# Patient Record
(demographics unavailable — no encounter records)

---

## 2024-10-23 NOTE — HISTORY OF PRESENT ILLNESS
[Disease: _____________________] : Disease: [unfilled] [T: ___] : T[unfilled] [N: ___] : N[unfilled] [M: ___] : M[unfilled] [AJCC Stage: ____] : AJCC Stage: [unfilled] [de-identified] : Mr. Charlie Tsai is a 72-year-old man who I met in early August for the first time after his PSA had been noted to be markedly elevated. PSA Jan 2021: 18.9 July 2022: 109  MRI of his prostate demonstrated a PI-RADS category 5 lesion noted at the right mid gland, posterior medial peripheral zone. There was evidence of involvement of the bilateral seminal vesicles, right greater than left. No adenopathy was noted.  8/30 underwent prostate biopsy with prostate adenocarcinoma, Sergio 4+4 disease 5 cores among total 12 cores with cancer involvement 5% to 70% on each core.  He received eligard on September 9, 2022 and bicalutamide.  9/27 CT CAP showed Sclerotic lesion in the L1 vertebral body measuring 2.1 cm, concerning for metastatic disease. Additional subcentimeter lesions in the left iliac bone and left femoral head may be bone islands rather than sclerotic metastases. Bilateral nonspecific pulmonary nodules measuring up to 6 mm in the lingula. whole body bone scan showed Focal increased uptake in L1 vertebral body concerning for osseous metastasis.  He is very functional, and totally asymptomatic. Urine flow is normal. He denies bone pain, hesitancy, urgency, dribbling and gross hematuria. Nocturia 1.  10/24/22 MRI spine showed L1 consistent with met, however, small lesion in the L2--may be hemangioma vs met.  11/11/22 feels overall fine, no fatigue/hot flash/sweating/. Denies burning, frequency, gross hematuria and dribbling. Nocturia 1 to 2.  12/14/22: Patient has been feeling okay on abiraterone, appetite and energy are okay. He goes for walks during the day and is able to carry out all of his ADLs and iADLs independently. He reports that he ran out of prednisone about a month ago and has not been taking it, he has been taking abiraterone in the morning on an empty stomach. Patient has regular bowel movements about 2 times a day and has nocturia x 2-3. He denies hot flashes, sweating and LE edema. Patient denies fever, chills, headache, vision changes, cough, shortness of breath, chest pain, palpitations, abdominal pain, nausea/vomiting, diarrhea, constipation, dysuria, hematuria, itching, rashes, joint pain, mucositis, changes in sensation. Patient's BP at this visit is elevated to 161/104, he reports having taken in this morning and ti was 142/79, he does not take blood pressure medication. At present he denies headache, vision changes, numbness and tingling, SOB and chest pain.  1/13/23 his /104. Reports feeling well, denies fatigue, hot flash, sweating, headache, blurry vision and dizziness.  3/3/23 BP ; denied headache, dizziness, blurry vision, n/v/d. BP today 194/81, repeat with manual /90. Pt reports that at home BP around 150s/80s.  5/26/23 today -200/98 to 103, he admits only on intermittent treatment, denies nausea, vomiting, blurry vision and numbness and tingling in fingers and toes.  7/26/23 Today /93, admits taking abiraterone and prednisone all the time. Feels fine, denies fatigue, hot flash and sweating.  9/27/23 his BP med losartan-HCTZ increased from 50-12.5 to 100-12.5mg daily from his cardiologist. Today feels fine, denies headache, dizziness and blurry vision. He eats a lot of salt. He told me most of time his  or below and DBP 80s and 70s at home.  11/13/23 /107, reports feeling great, denies headache, dizziness and blurry vision. At home, his BP was SBP ~140, DBP ~85.  2/26/24: BP today is 190/99, pt denies headache, visual changes, blurry vision, chest pain, palpitations, shortness of breath. He says he checks his BP once a week and it is normally 140s/70s. He takes losartan, however he does not know, nor does he have a list of his medications, due to this med recc could not be reliably completed. He says he feels well, he stays active; walks and runs. Energy is good, appetite is good. He denies pain. Reports nocturia x 1-2. Denies hesitancy, incomplete emptying and leaking.    3/22/24: At last visit on 2/26, BP = 190/99, asked pt to hold abiraterone, he did not do so, he went to see Cardiology on 3/1, his BP at that time was 180/90 --> 164/90, no med changes, seems pt was non compliant with BP meds.  4/5/24 He reports that he started losartan about one month ago in March 2024. He has been complying with BP med.  BP at home around 145-150/80s.  Denies headache, dizziness, blurry vision, N/V, fatigue, night sweats, changes with urination.    [de-identified] : prostate adenocarcinoma [de-identified] : 5/1/24: His blood pressure today was elevated as before. He reports taking his blood pressure medications as prescribed and stated that his blood pressure is in the 140s at home, Otherwise patient is doing well. No complaints.   6/13/24 /88 today, denies headache, blurry vision and weakness. Denies fatigue, hot flashes and sweating. He has normal urination.   8/12/24 reports BP at home 140 to 145/80-85 while on amlodipine, losartan and metoprolol. Today his repeat /94. Denies headache, blurry vision and dizziness.  Feels overall fine, denies fatigue, hot flashes and sweating.    10/2/24 /82 then 20 minutes after 156/84, he told that he did not take his BP meds. Denies headache, blurry vision and weakiness.   10/23/24 - BP is 171/88 today, asymptomatic. Feeling "good", no fatigue. Urine flow is "good", nocturia 1-2x/night.

## 2024-10-23 NOTE — REVIEW OF SYSTEMS
[Diarrhea: Grade 0] : Diarrhea: Grade 0 [Fever] : no fever [Chills] : no chills [Night Sweats] : no night sweats [Fatigue] : no fatigue [Vision Problems] : no vision problems [Chest Pain] : no chest pain [Palpitations] : no palpitations [Lower Ext Edema] : no lower extremity edema [Shortness Of Breath] : no shortness of breath [Cough] : no cough [Abdominal Pain] : no abdominal pain [Vomiting] : no vomiting [Constipation] : no constipation [Dysuria] : no dysuria [Incontinence] : no incontinence [Joint Pain] : no joint pain [Joint Stiffness] : no joint stiffness [Muscle Pain] : no muscle pain [Muscle Weakness] : no muscle weakness [Dizziness] : no dizziness [Hot Flashes] : no hot flashes [Easy Bleeding] : no tendency for easy bleeding [Easy Bruising] : no tendency for easy bruising

## 2024-10-23 NOTE — PHYSICAL EXAM
[Fully active, able to carry on all pre-disease performance without restriction] : Status 0 - Fully active, able to carry on all pre-disease performance without restriction [Normal] : affect appropriate [de-identified] : anicteric  [de-identified] : no LE edema

## 2024-10-23 NOTE — ASSESSMENT
[FreeTextEntry1] : 73 year old male with solitary L1 metastatic hormone sensitive prostate cancer with kaveh 4+4, CHERRY and seminal vesicle involvement, [id number]. He has oligometastatic prostate cancer on conventional images. Rising PSA to 109 in July 2022, biopsy showed multiple cores with kaveh 4+4, MRI showed SV involvement, no adenopathy. Bone scan and CT scans showed only L1 lesion. Started bicalutamide and eligard. Oligo mets for newly diagnosed prostate cancer to add abiraterone/pred on ADT given kaveh 4+4 and T3b. He was evaluated by Dr. Stein. Patient radiated with Stampede protocol. S/p RT to prostate 5500 cGy in 20 fx in Dec 2022, and s/p SBRT L1 2,700 cGy in 3 fx. Started abiraterone/prednisone in October 2022. PSA undetectable as of January 2023  Plan  [facility] with oligo mets s/p prostate radiation and SBRT L1 - iPSA was 109 in July 2022, undetectable as of Jan 2023. Most recent PSA on 10/2/24 was again undetectable.  - Pt declines labs today as they were just done 3wks ago.  - Continue abiraterone 1,000mg daily + prednisone 5mg daily, reports compliance.  - Reviewed abiraterone AEs with patient, including but not limited to: fatigue, leg edema, hypertension, hypernatremia, hypokalemia, liver damage, hot flashes, decreased libido - Continue Eligard [id number], last given 8/21/24, next due Nov 2024.  - Pt declinee DEXA scan  Hypertension - BP initially 171/88, asymptomatic. Repeat BP at end of visit was 158/88.   - We discussed that abiraterone can cause/worsen HTN. Uncontrolled HTN can lead to heart attack and/or stroke.  - Continue anti-hypertensives as prescribed: amlodipine 5mg daily, losartan-HCTZ 100-12.5mg daily, metoprolol tartrate 25mg daily).  - Monitor BP daily, contact office if SBP >160 and/or DBP >90.  - Continue to follow with Dr. Pace/Cardiology  Hypercalcemia; borderline - 10/2023 PTHRP = normal, not c/w hyperCa of malignancy - 10/2023 PTH = 48, nl - pt takes Ca supplement once a week  Instructed to contact our office with any new/worsening symptoms. Pt educated regarding plan of care, all questions/concerns addressed to the best of my abilities and his apparent satisfaction. Pt is leaving for [facility] from 11/5 - 12/18. He has f[facility]u [facility] Dr. Stark [facility]

## 2024-10-23 NOTE — HISTORY OF PRESENT ILLNESS
[Disease: _____________________] : Disease: [unfilled] [T: ___] : T[unfilled] [N: ___] : N[unfilled] [M: ___] : M[unfilled] [AJCC Stage: ____] : AJCC Stage: [unfilled] [de-identified] : Mr. Charlie Tsai is a 72-year-old man who I met in early August for the first time after his PSA had been noted to be markedly elevated. PSA Jan 2021: 18.9 July 2022: 109  MRI of his prostate demonstrated a PI-RADS category 5 lesion noted at the right mid gland, posterior medial peripheral zone. There was evidence of involvement of the bilateral seminal vesicles, right greater than left. No adenopathy was noted.  8/30 underwent prostate biopsy with prostate adenocarcinoma, Sergio 4+4 disease 5 cores among total 12 cores with cancer involvement 5% to 70% on each core.  He received eligard on September 9, 2022 and bicalutamide.  9/27 CT CAP showed Sclerotic lesion in the L1 vertebral body measuring 2.1 cm, concerning for metastatic disease. Additional subcentimeter lesions in the left iliac bone and left femoral head may be bone islands rather than sclerotic metastases. Bilateral nonspecific pulmonary nodules measuring up to 6 mm in the lingula. whole body bone scan showed Focal increased uptake in L1 vertebral body concerning for osseous metastasis.  He is very functional, and totally asymptomatic. Urine flow is normal. He denies bone pain, hesitancy, urgency, dribbling and gross hematuria. Nocturia 1.  10/24/22 MRI spine showed L1 consistent with met, however, small lesion in the L2--may be hemangioma vs met.  11/11/22 feels overall fine, no fatigue/hot flash/sweating/. Denies burning, frequency, gross hematuria and dribbling. Nocturia 1 to 2.  12/14/22: Patient has been feeling okay on abiraterone, appetite and energy are okay. He goes for walks during the day and is able to carry out all of his ADLs and iADLs independently. He reports that he ran out of prednisone about a month ago and has not been taking it, he has been taking abiraterone in the morning on an empty stomach. Patient has regular bowel movements about 2 times a day and has nocturia x 2-3. He denies hot flashes, sweating and LE edema. Patient denies fever, chills, headache, vision changes, cough, shortness of breath, chest pain, palpitations, abdominal pain, nausea/vomiting, diarrhea, constipation, dysuria, hematuria, itching, rashes, joint pain, mucositis, changes in sensation. Patient's BP at this visit is elevated to 161/104, he reports having taken in this morning and ti was 142/79, he does not take blood pressure medication. At present he denies headache, vision changes, numbness and tingling, SOB and chest pain.  1/13/23 his /104. Reports feeling well, denies fatigue, hot flash, sweating, headache, blurry vision and dizziness.  3/3/23 BP ; denied headache, dizziness, blurry vision, n/v/d. BP today 194/81, repeat with manual /90. Pt reports that at home BP around 150s/80s.  5/26/23 today -200/98 to 103, he admits only on intermittent treatment, denies nausea, vomiting, blurry vision and numbness and tingling in fingers and toes.  7/26/23 Today /93, admits taking abiraterone and prednisone all the time. Feels fine, denies fatigue, hot flash and sweating.  9/27/23 his BP med losartan-HCTZ increased from 50-12.5 to 100-12.5mg daily from his cardiologist. Today feels fine, denies headache, dizziness and blurry vision. He eats a lot of salt. He told me most of time his  or below and DBP 80s and 70s at home.  11/13/23 /107, reports feeling great, denies headache, dizziness and blurry vision. At home, his BP was SBP ~140, DBP ~85.  2/26/24: BP today is 190/99, pt denies headache, visual changes, blurry vision, chest pain, palpitations, shortness of breath. He says he checks his BP once a week and it is normally 140s/70s. He takes losartan, however he does not know, nor does he have a list of his medications, due to this med recc could not be reliably completed. He says he feels well, he stays active; walks and runs. Energy is good, appetite is good. He denies pain. Reports nocturia x 1-2. Denies hesitancy, incomplete emptying and leaking.    3/22/24: At last visit on 2/26, BP = 190/99, asked pt to hold abiraterone, he did not do so, he went to see Cardiology on 3/1, his BP at that time was 180/90 --> 164/90, no med changes, seems pt was non compliant with BP meds.  4/5/24 He reports that he started losartan about one month ago in March 2024. He has been complying with BP med.  BP at home around 145-150/80s.  Denies headache, dizziness, blurry vision, N/V, fatigue, night sweats, changes with urination.    [de-identified] : prostate adenocarcinoma [de-identified] : 5/1/24: His blood pressure today was elevated as before. He reports taking his blood pressure medications as prescribed and stated that his blood pressure is in the 140s at home, Otherwise patient is doing well. No complaints.   6/13/24 /88 today, denies headache, blurry vision and weakness. Denies fatigue, hot flashes and sweating. He has normal urination.   8/12/24 reports BP at home 140 to 145/80-85 while on amlodipine, losartan and metoprolol. Today his repeat /94. Denies headache, blurry vision and dizziness.  Feels overall fine, denies fatigue, hot flashes and sweating.    10/2/24 /82 then 20 minutes after 156/84, he told that he did not take his BP meds. Denies headache, blurry vision and weakiness.   10/23/24 - BP is 171/88 today, asymptomatic. Feeling "good", no fatigue. Urine flow is "good", nocturia 1-2x/night.

## 2024-10-23 NOTE — ASSESSMENT
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 He has oligometastatic prostate cancer on conventional images. Rising PSA to 109 in July 2022, biopsy showed multiple cores with kaveh 4+4, MRI showed SV involvement, no adenopathy. Bone scan and CT scans showed only L1 lesion. Started bicalutamide and eligard. Oligo mets for newly diagnosed prostate cancer to add abiraterone/pred on ADT given kaveh 4+4 and T3b. He was evaluated by Dr. Stein. Patient radiated with Stampede protocol. S/p RT to prostate 5500 cGy in 20 fx in Dec 2022, and s/p SBRT L1 2,700 cGy in 3 fx. Started abiraterone/prednisone in October 2022. PSA undetectable as of January 2023  Plan  [facility] with oligo mets s/p prostate radiation and SBRT L1 - iPSA was 109 in July 2022, undetectable as of Jan 2023. Most recent PSA on 10/2/24 was again undetectable.  - Pt declines labs today as they were just done 3wks ago.  - Continue abiraterone 1,000mg daily + prednisone 5mg daily, reports compliance.  - Reviewed abiraterone AEs with patient, including but not limited to: fatigue, leg edema, hypertension, hypernatremia, hypokalemia, liver damage, hot flashes, decreased libido - Continue Eligard [id number], last given 8/21/24, next due Nov 2024.  - Pt declinee DEXA scan  Hypertension - BP initially 171/88, asymptomatic. Repeat BP at end of visit was 158/88.   - We discussed that abiraterone can cause/worsen HTN. Uncontrolled HTN can lead to heart attack and/or stroke.  - Continue anti-hypertensives as prescribed: amlodipine 5mg daily, losartan-HCTZ 100-12.5mg daily, metoprolol tartrate 25mg daily).  - Monitor BP daily, contact office if SBP >160 and/or DBP >90.  - Continue to follow with Dr. Pace/Cardiology  Hypercalcemia; borderline - 10/2023 PTHRP = normal, not c/w hyperCa of malignancy - 10/2023 PTH = 48, nl - pt takes Ca supplement once a week  Instructed to contact our office with any new/worsening symptoms. Pt educated regarding plan of care, all questions/concerns addressed to the best of my abilities and his apparent satisfaction. Pt is leaving for [facility] from 11/5 - 12/18. He has f/u with Dr. Stark [facility]

## 2024-10-23 NOTE — PHYSICAL EXAM
[Fully active, able to carry on all pre-disease performance without restriction] : Status 0 - Fully active, able to carry on all pre-disease performance without restriction [Normal] : affect appropriate [de-identified] : anicteric  [de-identified] : no LE edema

## 2024-12-23 NOTE — ASSESSMENT
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 He has oligometastatic prostate cancer on conventional images. Rising PSA to 109 in July 2022, biopsy showed multiple cores with kaveh 4+4, MRI showed SV involvement, no adenopathy. Bone scan and CT scans showed only L1 lesion. Started bicalutamide and eligard. Oligo mets for newly diagnosed prostate cancer to add abiraterone/pred on ADT given kaveh 4+4 and T3b. He was evaluated by Dr. Stein. Patient radiated with Stampede protocol. S/p RT to prostate 5500 cGy in 20 fx in Dec 2022, and s/p SBRT L1 2,700 cGy in 3 fx. Started abiraterone/prednisone in October 2022. PSA undetectable as of January 2023  Plan  [facility] with oligo mets s/p prostate radiation and SBRT L1 - iPSA was 109 in July 2022, undetectable as of Jan 2023. Most recent PSA on 10/2/24 was again undetectable.  - Pt declines labs today as they were just done 3wks ago.  - Continue abiraterone 1,000mg daily + prednisone 5mg daily, reports compliance.  - Reviewed abiraterone AEs with patient, including but not limited to: fatigue, leg edema, hypertension, hypernatremia, hypokalemia, liver damage, hot flashes, decreased libido - Continue Eligard [id number], last given 8/21/24, next due Nov 2024.  - Pt decline DEXA scan  Hypertension - Repeat BP at end of visit was 158/91.   - We discussed that abiraterone can cause/worsen HTN. Uncontrolled HTN can lead to heart attack and/or stroke.  - Continue anti-hypertensives as prescribed: amlodipine 5mg daily, losartan-HCTZ 100-12.5mg daily, metoprolol tartrate 25mg daily).  - Monitor BP daily, contact office if SBP >160 and/or DBP >90.  - Continue to follow with Dr. Pace/Cardiology  Hypercalcemia; borderline - 10/2023 PTHRP = normal, not c/w hyperCa of malignancy - 10/2023 PTH = 48, nl - pt takes Ca supplement once a week  Instructed to contact our office with any new/worsening symptoms. Pt educated regarding plan of care, all questions/concerns addressed to the best of my abilities and his apparent satisfaction. Pt is leaving for [facility] from 11/5 - 12/18. He has f/u with Dr. Stark and his next [facility] inj on 12/23/24.

## 2024-12-23 NOTE — HISTORY OF PRESENT ILLNESS
[Disease: _____________________] : Disease: [unfilled] [T: ___] : T[unfilled] [N: ___] : N[unfilled] [M: ___] : M[unfilled] [AJCC Stage: ____] : AJCC Stage: [unfilled] [de-identified] : Mr. Charlie Tsai is a 72-year-old man who I met in early August for the first time after his PSA had been noted to be markedly elevated. PSA Jan 2021: 18.9 July 2022: 109  MRI of his prostate demonstrated a PI-RADS category 5 lesion noted at the right mid gland, posterior medial peripheral zone. There was evidence of involvement of the bilateral seminal vesicles, right greater than left. No adenopathy was noted.  8/30 underwent prostate biopsy with prostate adenocarcinoma, Sergio 4+4 disease 5 cores among total 12 cores with cancer involvement 5% to 70% on each core.  He received eligard on September 9, 2022 and bicalutamide.  9/27 CT CAP showed Sclerotic lesion in the L1 vertebral body measuring 2.1 cm, concerning for metastatic disease. Additional subcentimeter lesions in the left iliac bone and left femoral head may be bone islands rather than sclerotic metastases. Bilateral nonspecific pulmonary nodules measuring up to 6 mm in the lingula. whole body bone scan showed Focal increased uptake in L1 vertebral body concerning for osseous metastasis.  He is very functional, and totally asymptomatic. Urine flow is normal. He denies bone pain, hesitancy, urgency, dribbling and gross hematuria. Nocturia 1.  10/24/22 MRI spine showed L1 consistent with met, however, small lesion in the L2--may be hemangioma vs met.  11/11/22 feels overall fine, no fatigue/hot flash/sweating/. Denies burning, frequency, gross hematuria and dribbling. Nocturia 1 to 2.  12/14/22: Patient has been feeling okay on abiraterone, appetite and energy are okay. He goes for walks during the day and is able to carry out all of his ADLs and iADLs independently. He reports that he ran out of prednisone about a month ago and has not been taking it, he has been taking abiraterone in the morning on an empty stomach. Patient has regular bowel movements about 2 times a day and has nocturia x 2-3. He denies hot flashes, sweating and LE edema. Patient denies fever, chills, headache, vision changes, cough, shortness of breath, chest pain, palpitations, abdominal pain, nausea/vomiting, diarrhea, constipation, dysuria, hematuria, itching, rashes, joint pain, mucositis, changes in sensation. Patient's BP at this visit is elevated to 161/104, he reports having taken in this morning and ti was 142/79, he does not take blood pressure medication. At present he denies headache, vision changes, numbness and tingling, SOB and chest pain.  1/13/23 his /104. Reports feeling well, denies fatigue, hot flash, sweating, headache, blurry vision and dizziness.  3/3/23 BP ; denied headache, dizziness, blurry vision, n/v/d. BP today 194/81, repeat with manual /90. Pt reports that at home BP around 150s/80s.  5/26/23 today -200/98 to 103, he admits only on intermittent treatment, denies nausea, vomiting, blurry vision and numbness and tingling in fingers and toes.  7/26/23 Today /93, admits taking abiraterone and prednisone all the time. Feels fine, denies fatigue, hot flash and sweating.  9/27/23 his BP med losartan-HCTZ increased from 50-12.5 to 100-12.5mg daily from his cardiologist. Today feels fine, denies headache, dizziness and blurry vision. He eats a lot of salt. He told me most of time his  or below and DBP 80s and 70s at home.  11/13/23 /107, reports feeling great, denies headache, dizziness and blurry vision. At home, his BP was SBP ~140, DBP ~85.  2/26/24: BP today is 190/99, pt denies headache, visual changes, blurry vision, chest pain, palpitations, shortness of breath. He says he checks his BP once a week and it is normally 140s/70s. He takes losartan, however he does not know, nor does he have a list of his medications, due to this med recc could not be reliably completed. He says he feels well, he stays active; walks and runs. Energy is good, appetite is good. He denies pain. Reports nocturia x 1-2. Denies hesitancy, incomplete emptying and leaking.    3/22/24: At last visit on 2/26, BP = 190/99, asked pt to hold abiraterone, he did not do so, he went to see Cardiology on 3/1, his BP at that time was 180/90 --> 164/90, no med changes, seems pt was non compliant with BP meds.  4/5/24 He reports that he started losartan about one month ago in March 2024. He has been complying with BP med.  BP at home around 145-150/80s.  Denies headache, dizziness, blurry vision, N/V, fatigue, night sweats, changes with urination.    [de-identified] : prostate adenocarcinoma [de-identified] : 5/1/24: His blood pressure today was elevated as before. He reports taking his blood pressure medications as prescribed and stated that his blood pressure is in the 140s at home, Otherwise patient is doing well. No complaints.   6/13/24 /88 today, denies headache, blurry vision and weakness. Denies fatigue, hot flashes and sweating. He has normal urination.   8/12/24 reports BP at home 140 to 145/80-85 while on amlodipine, losartan and metoprolol. Today his repeat /94. Denies headache, blurry vision and dizziness.  Feels overall fine, denies fatigue, hot flashes and sweating.    10/2/24 /82 then 20 minutes after 156/84, he told that he did not take his BP meds. Denies headache, blurry vision and weakiness.   10/23/24 - BP is 171/88 today, asymptomatic. Feeling "good", no fatigue. Urine flow is "good", nocturia 1-2x/night.  12/23/24 Feels great, no fatigue/hot flash/sweating. Has normal urination. Returned from [place] recently. Denies headache, blurry vision and weakness in arms and legs.

## 2024-12-23 NOTE — PHYSICAL EXAM
[Fully active, able to carry on all pre-disease performance without restriction] : Status 0 - Fully active, able to carry on all pre-disease performance without restriction [Normal] : affect appropriate [de-identified] : anicteric  [de-identified] : no LE edema  no

## 2024-12-23 NOTE — PHYSICAL EXAM
[Fully active, able to carry on all pre-disease performance without restriction] : Status 0 - Fully active, able to carry on all pre-disease performance without restriction [Normal] : affect appropriate [de-identified] : anicteric  [de-identified] : no LE edema

## 2024-12-23 NOTE — HISTORY OF PRESENT ILLNESS
[Disease: _____________________] : Disease: [unfilled] [T: ___] : T[unfilled] [N: ___] : N[unfilled] [M: ___] : M[unfilled] [AJCC Stage: ____] : AJCC Stage: [unfilled] [de-identified] : Mr. Charlie Tsai is a 72-year-old man who I met in early August for the first time after his PSA had been noted to be markedly elevated. PSA Jan 2021: 18.9 July 2022: 109  MRI of his prostate demonstrated a PI-RADS category 5 lesion noted at the right mid gland, posterior medial peripheral zone. There was evidence of involvement of the bilateral seminal vesicles, right greater than left. No adenopathy was noted.  8/30 underwent prostate biopsy with prostate adenocarcinoma, Sergio 4+4 disease 5 cores among total 12 cores with cancer involvement 5% to 70% on each core.  He received eligard on September 9, 2022 and bicalutamide.  9/27 CT CAP showed Sclerotic lesion in the L1 vertebral body measuring 2.1 cm, concerning for metastatic disease. Additional subcentimeter lesions in the left iliac bone and left femoral head may be bone islands rather than sclerotic metastases. Bilateral nonspecific pulmonary nodules measuring up to 6 mm in the lingula. whole body bone scan showed Focal increased uptake in L1 vertebral body concerning for osseous metastasis.  He is very functional, and totally asymptomatic. Urine flow is normal. He denies bone pain, hesitancy, urgency, dribbling and gross hematuria. Nocturia 1.  10/24/22 MRI spine showed L1 consistent with met, however, small lesion in the L2--may be hemangioma vs met.  11/11/22 feels overall fine, no fatigue/hot flash/sweating/. Denies burning, frequency, gross hematuria and dribbling. Nocturia 1 to 2.  12/14/22: Patient has been feeling okay on abiraterone, appetite and energy are okay. He goes for walks during the day and is able to carry out all of his ADLs and iADLs independently. He reports that he ran out of prednisone about a month ago and has not been taking it, he has been taking abiraterone in the morning on an empty stomach. Patient has regular bowel movements about 2 times a day and has nocturia x 2-3. He denies hot flashes, sweating and LE edema. Patient denies fever, chills, headache, vision changes, cough, shortness of breath, chest pain, palpitations, abdominal pain, nausea/vomiting, diarrhea, constipation, dysuria, hematuria, itching, rashes, joint pain, mucositis, changes in sensation. Patient's BP at this visit is elevated to 161/104, he reports having taken in this morning and ti was 142/79, he does not take blood pressure medication. At present he denies headache, vision changes, numbness and tingling, SOB and chest pain.  1/13/23 his /104. Reports feeling well, denies fatigue, hot flash, sweating, headache, blurry vision and dizziness.  3/3/23 BP ; denied headache, dizziness, blurry vision, n/v/d. BP today 194/81, repeat with manual /90. Pt reports that at home BP around 150s/80s.  5/26/23 today -200/98 to 103, he admits only on intermittent treatment, denies nausea, vomiting, blurry vision and numbness and tingling in fingers and toes.  7/26/23 Today /93, admits taking abiraterone and prednisone all the time. Feels fine, denies fatigue, hot flash and sweating.  9/27/23 his BP med losartan-HCTZ increased from 50-12.5 to 100-12.5mg daily from his cardiologist. Today feels fine, denies headache, dizziness and blurry vision. He eats a lot of salt. He told me most of time his  or below and DBP 80s and 70s at home.  11/13/23 /107, reports feeling great, denies headache, dizziness and blurry vision. At home, his BP was SBP ~140, DBP ~85.  2/26/24: BP today is 190/99, pt denies headache, visual changes, blurry vision, chest pain, palpitations, shortness of breath. He says he checks his BP once a week and it is normally 140s/70s. He takes losartan, however he does not know, nor does he have a list of his medications, due to this med recc could not be reliably completed. He says he feels well, he stays active; walks and runs. Energy is good, appetite is good. He denies pain. Reports nocturia x 1-2. Denies hesitancy, incomplete emptying and leaking.    3/22/24: At last visit on 2/26, BP = 190/99, asked pt to hold abiraterone, he did not do so, he went to see Cardiology on 3/1, his BP at that time was 180/90 --> 164/90, no med changes, seems pt was non compliant with BP meds.  4/5/24 He reports that he started losartan about one month ago in March 2024. He has been complying with BP med.  BP at home around 145-150/80s.  Denies headache, dizziness, blurry vision, N/V, fatigue, night sweats, changes with urination.    [de-identified] : prostate adenocarcinoma [de-identified] : 5/1/24: His blood pressure today was elevated as before. He reports taking his blood pressure medications as prescribed and stated that his blood pressure is in the 140s at home, Otherwise patient is doing well. No complaints.   6/13/24 /88 today, denies headache, blurry vision and weakness. Denies fatigue, hot flashes and sweating. He has normal urination.   8/12/24 reports BP at home 140 to 145/80-85 while on amlodipine, losartan and metoprolol. Today his repeat /94. Denies headache, blurry vision and dizziness.  Feels overall fine, denies fatigue, hot flashes and sweating.    10/2/24 /82 then 20 minutes after 156/84, he told that he did not take his BP meds. Denies headache, blurry vision and weakiness.   10/23/24 - BP is 171/88 today, asymptomatic. Feeling "good", no fatigue. Urine flow is "good", nocturia 1-2x/night.  12/23/24 Feels great, no fatigue/hot flash/sweating. Has normal urination. Returned from [place] recently. Denies headache, blurry vision and weakness in arms and legs.

## 2024-12-23 NOTE — ASSESSMENT
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 He has oligometastatic prostate cancer on conventional images. Rising PSA to 109 in July 2022, biopsy showed multiple cores with kaveh 4+4, MRI showed SV involvement, no adenopathy. Bone scan and CT scans showed only L1 lesion. Started bicalutamide and eligard. Oligo mets for newly diagnosed prostate cancer to add abiraterone/pred on ADT given kaveh 4+4 and T3b. He was evaluated by Dr. Stein. Patient radiated with Stampede protocol. S/p RT to prostate 5500 cGy in 20 fx in Dec 2022, and s/p SBRT L1 2,700 cGy in 3 fx. Started abiraterone/prednisone in October 2022. PSA undetectable as of January 2023  Plan  [facility] with oligo mets s/p prostate radiation and SBRT L1 - iPSA was 109 in July 2022, undetectable as of Jan 2023. Most recent PSA on 10/2/24 was again undetectable.  - Pt declines labs today as they were just done 3wks ago.  - Continue abiraterone 1,000mg daily + prednisone 5mg daily, reports compliance.  - Reviewed abiraterone AEs with patient, including but not limited to: fatigue, leg edema, hypertension, hypernatremia, hypokalemia, liver damage, hot flashes, decreased libido - Continue Eligard [id number], last given 8/21/24, next due Nov 2024.  - Pt decline DEXA scan  Hypertension - Repeat BP at end of visit was 158/91.   - We discussed that abiraterone can cause/worsen HTN. Uncontrolled HTN can lead to heart attack and/or stroke.  - Continue anti-hypertensives as prescribed: amlodipine 5mg daily, losartan-HCTZ 100-12.5mg daily, metoprolol tartrate 25mg daily).  - Monitor BP daily, contact office if SBP >160 and/or DBP >90.  - Continue to follow with Dr. Pace/Cardiology  Hypercalcemia; borderline - 10/2023 PTHRP = normal, not c/w hyperCa of malignancy - 10/2023 PTH = 48, nl - pt takes Ca supplement once a week  Instructed to contact our office with any new/worsening symptoms. Pt educated regarding plan of care, all questions/concerns addressed to the best of my abilities and his apparent satisfaction. Pt is leaving for [facility] from 11/5 - 12/18. He has f/u with Dr. Stark and his next [facility] inj on 12/23/24.

## 2025-02-24 NOTE — ASSESSMENT
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 He has oligometastatic prostate cancer on conventional images. Rising PSA to 109 in July 2022, biopsy showed multiple cores with kaveh 4+4, MRI showed SV involvement, no adenopathy. Bone scan and CT scans showed only L1 lesion. Started bicalutamide and eligard. Oligo mets for newly diagnosed prostate cancer to add abiraterone/pred on ADT given kaveh 4+4 and T3b. He was evaluated by Dr. Stein. Patient radiated with [facility] protocol. S/p RT to prostate 5500 cGy in 20 fx in Dec 2022, and s/p SBRT L1 2,700 cGy in 3 fx. Started abiraterone/prednisone in October 2022. PSA undetectable as of January 2023  Plan  [facility] with oligo mets s/p prostate radiation and SBRT L1 - iPSA was 109 in July 2022, undetectable as of Jan 2023. Most recent PSA on 12/23/24 was again undetectable.  - Continue abiraterone 1,000mg daily + prednisone 5mg daily, potential side effects reviewed again today.  - Continue Eligard [id number], last given 12/23/24, next due March 2025.  - DEXA ordered today.  Hypertension - BP initially 201/117, he attributes this to waiting long outside and feeling nervous whenever coming to [facility]. Reportedly checks his BP daily at home and has been 130-140's/70-80's. Repeat BP at end of visit was 160/90. - We discussed that abiraterone can cause/worsen HTN. Uncontrolled HTN can lead to heart attack and/or stroke.  - Continue anti-hypertensives as prescribed: amlodipine 5mg daily, losartan-HCTZ 100-12.5mg daily, metoprolol tartrate 25mg daily).  - Monitor BP daily, contact office if SBP >160 and/or DBP >90.  - Continue to follow with Dr. Pace/Cardiology, has f/u with PCP in April.   Hypercalcemia; borderline - 10/2023 PTHrp = normal, not c/w hyperCa of malignancy - 10/2023 PTH = 48, nl - pt takes Ca supplement once a week  Instructed to contact our office with any new/worsening symptoms. Pt educated regarding plan of care, all questions/concerns addressed to the best of my abilities and his apparent satisfaction. RTC in 4wks recommended given elevated BP but deferred to 6wks per pt insistence.

## 2025-02-24 NOTE — PHYSICAL EXAM
[Fully active, able to carry on all pre-disease performance without restriction] : Status 0 - Fully active, able to carry on all pre-disease performance without restriction [Normal] : affect appropriate [de-identified] : anicteric  [de-identified] : no LE edema

## 2025-02-24 NOTE — HISTORY OF PRESENT ILLNESS
[Disease: _____________________] : Disease: [unfilled] [T: ___] : T[unfilled] [N: ___] : N[unfilled] [M: ___] : M[unfilled] [AJCC Stage: ____] : AJCC Stage: [unfilled] [de-identified] : Mr. Charlie Tsai is a 72-year-old man who I met in early August for the first time after his PSA had been noted to be markedly elevated. PSA Jan 2021: 18.9 July 2022: 109  MRI of his prostate demonstrated a PI-RADS category 5 lesion noted at the right mid gland, posterior medial peripheral zone. There was evidence of involvement of the bilateral seminal vesicles, right greater than left. No adenopathy was noted.  8/30 underwent prostate biopsy with prostate adenocarcinoma, Sergio 4+4 disease 5 cores among total 12 cores with cancer involvement 5% to 70% on each core.  He received eligard on September 9, 2022 and bicalutamide.  9/27 CT CAP showed Sclerotic lesion in the L1 vertebral body measuring 2.1 cm, concerning for metastatic disease. Additional subcentimeter lesions in the left iliac bone and left femoral head may be bone islands rather than sclerotic metastases. Bilateral nonspecific pulmonary nodules measuring up to 6 mm in the lingula. whole body bone scan showed Focal increased uptake in L1 vertebral body concerning for osseous metastasis.  He is very functional, and totally asymptomatic. Urine flow is normal. He denies bone pain, hesitancy, urgency, dribbling and gross hematuria. Nocturia 1.  10/24/22 MRI spine showed L1 consistent with met, however, small lesion in the L2--may be hemangioma vs met.  11/11/22 feels overall fine, no fatigue/hot flash/sweating/. Denies burning, frequency, gross hematuria and dribbling. Nocturia 1 to 2.  12/14/22: Patient has been feeling okay on abiraterone, appetite and energy are okay. He goes for walks during the day and is able to carry out all of his ADLs and iADLs independently. He reports that he ran out of prednisone about a month ago and has not been taking it, he has been taking abiraterone in the morning on an empty stomach. Patient has regular bowel movements about 2 times a day and has nocturia x 2-3. He denies hot flashes, sweating and LE edema. Patient denies fever, chills, headache, vision changes, cough, shortness of breath, chest pain, palpitations, abdominal pain, nausea/vomiting, diarrhea, constipation, dysuria, hematuria, itching, rashes, joint pain, mucositis, changes in sensation. Patient's BP at this visit is elevated to 161/104, he reports having taken in this morning and ti was 142/79, he does not take blood pressure medication. At present he denies headache, vision changes, numbness and tingling, SOB and chest pain.  1/13/23 his /104. Reports feeling well, denies fatigue, hot flash, sweating, headache, blurry vision and dizziness.  3/3/23 BP ; denied headache, dizziness, blurry vision, n/v/d. BP today 194/81, repeat with manual /90. Pt reports that at home BP around 150s/80s.  5/26/23 today -200/98 to 103, he admits only on intermittent treatment, denies nausea, vomiting, blurry vision and numbness and tingling in fingers and toes.  7/26/23 Today /93, admits taking abiraterone and prednisone all the time. Feels fine, denies fatigue, hot flash and sweating.  9/27/23 his BP med losartan-HCTZ increased from 50-12.5 to 100-12.5mg daily from his cardiologist. Today feels fine, denies headache, dizziness and blurry vision. He eats a lot of salt. He told me most of time his  or below and DBP 80s and 70s at home.  11/13/23 /107, reports feeling great, denies headache, dizziness and blurry vision. At home, his BP was SBP ~140, DBP ~85.  2/26/24: BP today is 190/99, pt denies headache, visual changes, blurry vision, chest pain, palpitations, shortness of breath. He says he checks his BP once a week and it is normally 140s/70s. He takes losartan, however he does not know, nor does he have a list of his medications, due to this med recc could not be reliably completed. He says he feels well, he stays active; walks and runs. Energy is good, appetite is good. He denies pain. Reports nocturia x 1-2. Denies hesitancy, incomplete emptying and leaking.    3/22/24: At last visit on 2/26, BP = 190/99, asked pt to hold abiraterone, he did not do so, he went to see Cardiology on 3/1, his BP at that time was 180/90 --> 164/90, no med changes, seems pt was non compliant with BP meds.  4/5/24 He reports that he started losartan about one month ago in March 2024. He has been complying with BP med.  BP at home around 145-150/80s.  Denies headache, dizziness, blurry vision, N/V, fatigue, night sweats, changes with urination.    [de-identified] : prostate adenocarcinoma [de-identified] : 5/1/24: His blood pressure today was elevated as before. He reports taking his blood pressure medications as prescribed and stated that his blood pressure is in the 140s at home, Otherwise patient is doing well. No complaints.   6/13/24 /88 today, denies headache, blurry vision and weakness. Denies fatigue, hot flashes and sweating. He has normal urination.   8/12/24 reports BP at home 140 to 145/80-85 while on amlodipine, losartan and metoprolol. Today his repeat /94. Denies headache, blurry vision and dizziness.  Feels overall fine, denies fatigue, hot flashes and sweating.    10/2/24 /82 then 20 minutes after 156/84, he told that he did not take his BP meds. Denies headache, blurry vision and weakiness.   10/23/24 - BP is 171/88 today, asymptomatic. Feeling "good", no fatigue. Urine flow is "good", nocturia 1-2x/night.  12/23/24 Feels great, no fatigue/hot flash/sweating. Has normal urination. Returned from [place] recently. Denies headache, blurry vision and weakness in arms and legs.   2/24/25 - no fatigue. /117 today, asymptomatic. He attributes this to waiting a long time outside and feeling nervous when coming to the office, reports his BP at home has been 130-140's/70-80's. Urine flow is stable, no urgency, nocturia 1-2x/night. Denies fever, chills, night sweats, hot flashes, headache, dizziness, chest pain, palpitations, SOB, cough, nausea/vomiting, diarrhea/constipation, abdominal pain, dysuria, hematuria, incontinence, LE edema, bleeding, muscle or joint pain/weakness.

## 2025-02-24 NOTE — PHYSICAL EXAM
[Fully active, able to carry on all pre-disease performance without restriction] : Status 0 - Fully active, able to carry on all pre-disease performance without restriction [Normal] : affect appropriate [de-identified] : anicteric  [de-identified] : no LE edema

## 2025-02-24 NOTE — ASSESSMENT
[FreeTextEntry1] : 74 year old male with solitary L1 metastatic hormone sensitive prostate cancer with kaveh 4+4, CHERRY and seminal vesicle involvement, [id number]. He has oligometastatic prostate cancer on conventional images. Rising PSA to 109 in July 2022, biopsy showed multiple cores with kaveh 4+4, MRI showed SV involvement, no adenopathy. Bone scan and CT scans showed only L1 lesion. Started bicalutamide and eligard. Oligo mets for newly diagnosed prostate cancer to add abiraterone/pred on ADT given kaveh 4+4 and T3b. He was evaluated by Dr. Stein. Patient radiated with [facility] protocol. S/p RT to prostate 5500 cGy in 20 fx in Dec 2022, and s/p SBRT L1 2,700 cGy in 3 fx. Started abiraterone/prednisone in October 2022. PSA undetectable as of January 2023  Plan  [facility] with oligo mets s/p prostate radiation and SBRT L1 - iPSA was 109 in July 2022, undetectable as of Jan 2023. Most recent PSA on 12/23/24 was again undetectable.  - Continue abiraterone 1,000mg daily + prednisone 5mg daily, potential side effects reviewed again today.  - Continue Eligard [id number], last given 12/23/24, next due March 2025.  - DEXA ordered today.  Hypertension - BP initially 201/117, he attributes this to waiting long outside and feeling nervous whenever coming to [facility]. Reportedly checks his BP daily at home and has been 130-140's/70-80's. Repeat BP at end of visit was 160/90. - We discussed that abiraterone can cause/worsen HTN. Uncontrolled HTN can lead to heart attack and/or stroke.  - Continue anti-hypertensives as prescribed: amlodipine 5mg daily, losartan-HCTZ 100-12.5mg daily, metoprolol tartrate 25mg daily).  - Monitor BP daily, contact office if SBP >160 and/or DBP >90.  - Continue to follow with Dr. Pace/Cardiology, has f/u with PCP in April.   Hypercalcemia; borderline - 10/2023 PTHrp = normal, not c/w hyperCa of malignancy - 10/2023 PTH = 48, nl - pt takes Ca supplement once a week  Instructed to contact our office with any new/worsening symptoms. Pt educated regarding plan of care, all questions/concerns addressed to the best of my abilities and his apparent satisfaction. RTC in 4wks recommended given elevated BP but deferred to 6wks per pt insistence.

## 2025-02-24 NOTE — PHYSICAL EXAM
[Fully active, able to carry on all pre-disease performance without restriction] : Status 0 - Fully active, able to carry on all pre-disease performance without restriction [Normal] : affect appropriate [de-identified] : anicteric  [de-identified] : no LE edema

## 2025-02-24 NOTE — HISTORY OF PRESENT ILLNESS
[Disease: _____________________] : Disease: [unfilled] [T: ___] : T[unfilled] [N: ___] : N[unfilled] [M: ___] : M[unfilled] [AJCC Stage: ____] : AJCC Stage: [unfilled] [de-identified] : Mr. Charlie Tsai is a 72-year-old man who I met in early August for the first time after his PSA had been noted to be markedly elevated. PSA Jan 2021: 18.9 July 2022: 109  MRI of his prostate demonstrated a PI-RADS category 5 lesion noted at the right mid gland, posterior medial peripheral zone. There was evidence of involvement of the bilateral seminal vesicles, right greater than left. No adenopathy was noted.  8/30 underwent prostate biopsy with prostate adenocarcinoma, Sergio 4+4 disease 5 cores among total 12 cores with cancer involvement 5% to 70% on each core.  He received eligard on September 9, 2022 and bicalutamide.  9/27 CT CAP showed Sclerotic lesion in the L1 vertebral body measuring 2.1 cm, concerning for metastatic disease. Additional subcentimeter lesions in the left iliac bone and left femoral head may be bone islands rather than sclerotic metastases. Bilateral nonspecific pulmonary nodules measuring up to 6 mm in the lingula. whole body bone scan showed Focal increased uptake in L1 vertebral body concerning for osseous metastasis.  He is very functional, and totally asymptomatic. Urine flow is normal. He denies bone pain, hesitancy, urgency, dribbling and gross hematuria. Nocturia 1.  10/24/22 MRI spine showed L1 consistent with met, however, small lesion in the L2--may be hemangioma vs met.  11/11/22 feels overall fine, no fatigue/hot flash/sweating/. Denies burning, frequency, gross hematuria and dribbling. Nocturia 1 to 2.  12/14/22: Patient has been feeling okay on abiraterone, appetite and energy are okay. He goes for walks during the day and is able to carry out all of his ADLs and iADLs independently. He reports that he ran out of prednisone about a month ago and has not been taking it, he has been taking abiraterone in the morning on an empty stomach. Patient has regular bowel movements about 2 times a day and has nocturia x 2-3. He denies hot flashes, sweating and LE edema. Patient denies fever, chills, headache, vision changes, cough, shortness of breath, chest pain, palpitations, abdominal pain, nausea/vomiting, diarrhea, constipation, dysuria, hematuria, itching, rashes, joint pain, mucositis, changes in sensation. Patient's BP at this visit is elevated to 161/104, he reports having taken in this morning and ti was 142/79, he does not take blood pressure medication. At present he denies headache, vision changes, numbness and tingling, SOB and chest pain.  1/13/23 his /104. Reports feeling well, denies fatigue, hot flash, sweating, headache, blurry vision and dizziness.  3/3/23 BP ; denied headache, dizziness, blurry vision, n/v/d. BP today 194/81, repeat with manual /90. Pt reports that at home BP around 150s/80s.  5/26/23 today -200/98 to 103, he admits only on intermittent treatment, denies nausea, vomiting, blurry vision and numbness and tingling in fingers and toes.  7/26/23 Today /93, admits taking abiraterone and prednisone all the time. Feels fine, denies fatigue, hot flash and sweating.  9/27/23 his BP med losartan-HCTZ increased from 50-12.5 to 100-12.5mg daily from his cardiologist. Today feels fine, denies headache, dizziness and blurry vision. He eats a lot of salt. He told me most of time his  or below and DBP 80s and 70s at home.  11/13/23 /107, reports feeling great, denies headache, dizziness and blurry vision. At home, his BP was SBP ~140, DBP ~85.  2/26/24: BP today is 190/99, pt denies headache, visual changes, blurry vision, chest pain, palpitations, shortness of breath. He says he checks his BP once a week and it is normally 140s/70s. He takes losartan, however he does not know, nor does he have a list of his medications, due to this med recc could not be reliably completed. He says he feels well, he stays active; walks and runs. Energy is good, appetite is good. He denies pain. Reports nocturia x 1-2. Denies hesitancy, incomplete emptying and leaking.    3/22/24: At last visit on 2/26, BP = 190/99, asked pt to hold abiraterone, he did not do so, he went to see Cardiology on 3/1, his BP at that time was 180/90 --> 164/90, no med changes, seems pt was non compliant with BP meds.  4/5/24 He reports that he started losartan about one month ago in March 2024. He has been complying with BP med.  BP at home around 145-150/80s.  Denies headache, dizziness, blurry vision, N/V, fatigue, night sweats, changes with urination.    [de-identified] : prostate adenocarcinoma [de-identified] : 5/1/24: His blood pressure today was elevated as before. He reports taking his blood pressure medications as prescribed and stated that his blood pressure is in the 140s at home, Otherwise patient is doing well. No complaints.   6/13/24 /88 today, denies headache, blurry vision and weakness. Denies fatigue, hot flashes and sweating. He has normal urination.   8/12/24 reports BP at home 140 to 145/80-85 while on amlodipine, losartan and metoprolol. Today his repeat /94. Denies headache, blurry vision and dizziness.  Feels overall fine, denies fatigue, hot flashes and sweating.    10/2/24 /82 then 20 minutes after 156/84, he told that he did not take his BP meds. Denies headache, blurry vision and weakiness.   10/23/24 - BP is 171/88 today, asymptomatic. Feeling "good", no fatigue. Urine flow is "good", nocturia 1-2x/night.  12/23/24 Feels great, no fatigue/hot flash/sweating. Has normal urination. Returned from [place] recently. Denies headache, blurry vision and weakness in arms and legs.   2/24/25 - no fatigue. /117 today, asymptomatic. He attributes this to waiting a long time outside and feeling nervous when coming to the office, reports his BP at home has been 130-140's/70-80's. Urine flow is stable, no urgency, nocturia 1-2x/night. Denies fever, chills, night sweats, hot flashes, headache, dizziness, chest pain, palpitations, SOB, cough, nausea/vomiting, diarrhea/constipation, abdominal pain, dysuria, hematuria, incontinence, LE edema, bleeding, muscle or joint pain/weakness.

## 2025-02-24 NOTE — HISTORY OF PRESENT ILLNESS
[Disease: _____________________] : Disease: [unfilled] [T: ___] : T[unfilled] [N: ___] : N[unfilled] [M: ___] : M[unfilled] [AJCC Stage: ____] : AJCC Stage: [unfilled] [de-identified] : Mr. Charlie Tsai is a 72-year-old man who I met in early August for the first time after his PSA had been noted to be markedly elevated. PSA Jan 2021: 18.9 July 2022: 109  MRI of his prostate demonstrated a PI-RADS category 5 lesion noted at the right mid gland, posterior medial peripheral zone. There was evidence of involvement of the bilateral seminal vesicles, right greater than left. No adenopathy was noted.  8/30 underwent prostate biopsy with prostate adenocarcinoma, Sergio 4+4 disease 5 cores among total 12 cores with cancer involvement 5% to 70% on each core.  He received eligard on September 9, 2022 and bicalutamide.  9/27 CT CAP showed Sclerotic lesion in the L1 vertebral body measuring 2.1 cm, concerning for metastatic disease. Additional subcentimeter lesions in the left iliac bone and left femoral head may be bone islands rather than sclerotic metastases. Bilateral nonspecific pulmonary nodules measuring up to 6 mm in the lingula. whole body bone scan showed Focal increased uptake in L1 vertebral body concerning for osseous metastasis.  He is very functional, and totally asymptomatic. Urine flow is normal. He denies bone pain, hesitancy, urgency, dribbling and gross hematuria. Nocturia 1.  10/24/22 MRI spine showed L1 consistent with met, however, small lesion in the L2--may be hemangioma vs met.  11/11/22 feels overall fine, no fatigue/hot flash/sweating/. Denies burning, frequency, gross hematuria and dribbling. Nocturia 1 to 2.  12/14/22: Patient has been feeling okay on abiraterone, appetite and energy are okay. He goes for walks during the day and is able to carry out all of his ADLs and iADLs independently. He reports that he ran out of prednisone about a month ago and has not been taking it, he has been taking abiraterone in the morning on an empty stomach. Patient has regular bowel movements about 2 times a day and has nocturia x 2-3. He denies hot flashes, sweating and LE edema. Patient denies fever, chills, headache, vision changes, cough, shortness of breath, chest pain, palpitations, abdominal pain, nausea/vomiting, diarrhea, constipation, dysuria, hematuria, itching, rashes, joint pain, mucositis, changes in sensation. Patient's BP at this visit is elevated to 161/104, he reports having taken in this morning and ti was 142/79, he does not take blood pressure medication. At present he denies headache, vision changes, numbness and tingling, SOB and chest pain.  1/13/23 his /104. Reports feeling well, denies fatigue, hot flash, sweating, headache, blurry vision and dizziness.  3/3/23 BP ; denied headache, dizziness, blurry vision, n/v/d. BP today 194/81, repeat with manual /90. Pt reports that at home BP around 150s/80s.  5/26/23 today -200/98 to 103, he admits only on intermittent treatment, denies nausea, vomiting, blurry vision and numbness and tingling in fingers and toes.  7/26/23 Today /93, admits taking abiraterone and prednisone all the time. Feels fine, denies fatigue, hot flash and sweating.  9/27/23 his BP med losartan-HCTZ increased from 50-12.5 to 100-12.5mg daily from his cardiologist. Today feels fine, denies headache, dizziness and blurry vision. He eats a lot of salt. He told me most of time his  or below and DBP 80s and 70s at home.  11/13/23 /107, reports feeling great, denies headache, dizziness and blurry vision. At home, his BP was SBP ~140, DBP ~85.  2/26/24: BP today is 190/99, pt denies headache, visual changes, blurry vision, chest pain, palpitations, shortness of breath. He says he checks his BP once a week and it is normally 140s/70s. He takes losartan, however he does not know, nor does he have a list of his medications, due to this med recc could not be reliably completed. He says he feels well, he stays active; walks and runs. Energy is good, appetite is good. He denies pain. Reports nocturia x 1-2. Denies hesitancy, incomplete emptying and leaking.    3/22/24: At last visit on 2/26, BP = 190/99, asked pt to hold abiraterone, he did not do so, he went to see Cardiology on 3/1, his BP at that time was 180/90 --> 164/90, no med changes, seems pt was non compliant with BP meds.  4/5/24 He reports that he started losartan about one month ago in March 2024. He has been complying with BP med.  BP at home around 145-150/80s.  Denies headache, dizziness, blurry vision, N/V, fatigue, night sweats, changes with urination.    [de-identified] : prostate adenocarcinoma [de-identified] : 5/1/24: His blood pressure today was elevated as before. He reports taking his blood pressure medications as prescribed and stated that his blood pressure is in the 140s at home, Otherwise patient is doing well. No complaints.   6/13/24 /88 today, denies headache, blurry vision and weakness. Denies fatigue, hot flashes and sweating. He has normal urination.   8/12/24 reports BP at home 140 to 145/80-85 while on amlodipine, losartan and metoprolol. Today his repeat /94. Denies headache, blurry vision and dizziness.  Feels overall fine, denies fatigue, hot flashes and sweating.    10/2/24 /82 then 20 minutes after 156/84, he told that he did not take his BP meds. Denies headache, blurry vision and weakiness.   10/23/24 - BP is 171/88 today, asymptomatic. Feeling "good", no fatigue. Urine flow is "good", nocturia 1-2x/night.  12/23/24 Feels great, no fatigue/hot flash/sweating. Has normal urination. Returned from [place] recently. Denies headache, blurry vision and weakness in arms and legs.   2/24/25 - no fatigue. /117 today, asymptomatic. He attributes this to waiting a long time outside and feeling nervous when coming to the office, reports his BP at home has been 130-140's/70-80's. Urine flow is stable, no urgency, nocturia 1-2x/night. Denies fever, chills, night sweats, hot flashes, headache, dizziness, chest pain, palpitations, SOB, cough, nausea/vomiting, diarrhea/constipation, abdominal pain, dysuria, hematuria, incontinence, LE edema, bleeding, muscle or joint pain/weakness.

## 2025-02-24 NOTE — REVIEW OF SYSTEMS
[Diarrhea: Grade 0] : Diarrhea: Grade 0 [Fever] : no fever [Chills] : no chills [Night Sweats] : no night sweats [Fatigue] : no fatigue [Chest Pain] : no chest pain [Palpitations] : no palpitations [Lower Ext Edema] : no lower extremity edema [Shortness Of Breath] : no shortness of breath [Cough] : no cough [Abdominal Pain] : no abdominal pain [Vomiting] : no vomiting [Constipation] : no constipation [Dysuria] : no dysuria [Incontinence] : no incontinence [Joint Pain] : no joint pain [Joint Stiffness] : no joint stiffness [Muscle Pain] : no muscle pain [Muscle Weakness] : no muscle weakness [Dizziness] : no dizziness [Hot Flashes] : no hot flashes [Easy Bleeding] : no tendency for easy bleeding [Easy Bruising] : no tendency for easy bruising

## 2025-04-02 NOTE — HISTORY OF PRESENT ILLNESS
[FreeTextEntry1] : This is a 75 y/o male with a pmhx of  Prostate Ca w/METs, HLD, HTN, DM coming in today for follow up. Past last seen 10/2023. Patient feels well overall. Patient denies chest pain, dyspnea, palpitations, dizziness, syncope, changes in bowel/bladder habits or appetite.

## 2025-04-02 NOTE — PHYSICAL EXAM
[Well Developed] : well developed [Well Nourished] : well nourished [No Acute Distress] : no acute distress [Normal Conjunctiva] : normal conjunctiva [Normal Venous Pressure] : normal venous pressure [No Carotid Bruit] : no carotid bruit [Normal S1, S2] : normal S1, S2 [No Rub] : no rub [No Gallop] : no gallop [Clear Lung Fields] : clear lung fields [Good Air Entry] : good air entry [No Respiratory Distress] : no respiratory distress  [Soft] : abdomen soft [Non Tender] : non-tender [No Masses/organomegaly] : no masses/organomegaly [Normal Bowel Sounds] : normal bowel sounds [Normal Gait] : normal gait [No Edema] : no edema [No Cyanosis] : no cyanosis [No Clubbing] : no clubbing [No Varicosities] : no varicosities [No Rash] : no rash [No Skin Lesions] : no skin lesions [Moves all extremities] : moves all extremities [No Focal Deficits] : no focal deficits [Normal Speech] : normal speech [Alert and Oriented] : alert and oriented [Normal memory] : normal memory [de-identified] : lump on neck  [de-identified] : 2/6 systolic murmur llsb

## 2025-05-19 NOTE — HISTORY OF PRESENT ILLNESS
[Disease: _____________________] : Disease: [unfilled] [T: ___] : T[unfilled] [N: ___] : N[unfilled] [M: ___] : M[unfilled] [AJCC Stage: ____] : AJCC Stage: [unfilled] [de-identified] : Mr. Charlie Tsai is a 72-year-old man who I met in early August for the first time after his PSA had been noted to be markedly elevated. PSA Jan 2021: 18.9 July 2022: 109  MRI of his prostate demonstrated a PI-RADS category 5 lesion noted at the right mid gland, posterior medial peripheral zone. There was evidence of involvement of the bilateral seminal vesicles, right greater than left. No adenopathy was noted.  8/30 underwent prostate biopsy with prostate adenocarcinoma, Sergio 4+4 disease 5 cores among total 12 cores with cancer involvement 5% to 70% on each core.  He received eligard on September 9, 2022 and bicalutamide.  9/27 CT CAP showed Sclerotic lesion in the L1 vertebral body measuring 2.1 cm, concerning for metastatic disease. Additional subcentimeter lesions in the left iliac bone and left femoral head may be bone islands rather than sclerotic metastases. Bilateral nonspecific pulmonary nodules measuring up to 6 mm in the lingula. whole body bone scan showed Focal increased uptake in L1 vertebral body concerning for osseous metastasis.  He is very functional, and totally asymptomatic. Urine flow is normal. He denies bone pain, hesitancy, urgency, dribbling and gross hematuria. Nocturia 1.  10/24/22 MRI spine showed L1 consistent with met, however, small lesion in the L2--may be hemangioma vs met.  11/11/22 feels overall fine, no fatigue/hot flash/sweating/. Denies burning, frequency, gross hematuria and dribbling. Nocturia 1 to 2.  12/14/22: Patient has been feeling okay on abiraterone, appetite and energy are okay. He goes for walks during the day and is able to carry out all of his ADLs and iADLs independently. He reports that he ran out of prednisone about a month ago and has not been taking it, he has been taking abiraterone in the morning on an empty stomach. Patient has regular bowel movements about 2 times a day and has nocturia x 2-3. He denies hot flashes, sweating and LE edema. Patient denies fever, chills, headache, vision changes, cough, shortness of breath, chest pain, palpitations, abdominal pain, nausea/vomiting, diarrhea, constipation, dysuria, hematuria, itching, rashes, joint pain, mucositis, changes in sensation. Patient's BP at this visit is elevated to 161/104, he reports having taken in this morning and ti was 142/79, he does not take blood pressure medication. At present he denies headache, vision changes, numbness and tingling, SOB and chest pain.  1/13/23 his /104. Reports feeling well, denies fatigue, hot flash, sweating, headache, blurry vision and dizziness.  3/3/23 BP ; denied headache, dizziness, blurry vision, n/v/d. BP today 194/81, repeat with manual /90. Pt reports that at home BP around 150s/80s.  5/26/23 today -200/98 to 103, he admits only on intermittent treatment, denies nausea, vomiting, blurry vision and numbness and tingling in fingers and toes.  7/26/23 Today /93, admits taking abiraterone and prednisone all the time. Feels fine, denies fatigue, hot flash and sweating.  9/27/23 his BP med losartan-HCTZ increased from 50-12.5 to 100-12.5mg daily from his cardiologist. Today feels fine, denies headache, dizziness and blurry vision. He eats a lot of salt. He told me most of time his  or below and DBP 80s and 70s at home.  11/13/23 /107, reports feeling great, denies headache, dizziness and blurry vision. At home, his BP was SBP ~140, DBP ~85.  2/26/24: BP today is 190/99, pt denies headache, visual changes, blurry vision, chest pain, palpitations, shortness of breath. He says he checks his BP once a week and it is normally 140s/70s. He takes losartan, however he does not know, nor does he have a list of his medications, due to this med recc could not be reliably completed. He says he feels well, he stays active; walks and runs. Energy is good, appetite is good. He denies pain. Reports nocturia x 1-2. Denies hesitancy, incomplete emptying and leaking.    3/22/24: At last visit on 2/26, BP = 190/99, asked pt to hold abiraterone, he did not do so, he went to see Cardiology on 3/1, his BP at that time was 180/90 --> 164/90, no med changes, seems pt was non compliant with BP meds.  4/5/24 He reports that he started losartan about one month ago in March 2024. He has been complying with BP med.  BP at home around 145-150/80s.  Denies headache, dizziness, blurry vision, N/V, fatigue, night sweats, changes with urination.    [de-identified] : prostate adenocarcinoma [de-identified] : 5/1/24: His blood pressure today was elevated as before. He reports taking his blood pressure medications as prescribed and stated that his blood pressure is in the 140s at home, Otherwise patient is doing well. No complaints.   6/13/24 /88 today, denies headache, blurry vision and weakness. Denies fatigue, hot flashes and sweating. He has normal urination.   8/12/24 reports BP at home 140 to 145/80-85 while on amlodipine, losartan and metoprolol. Today his repeat /94. Denies headache, blurry vision and dizziness.  Feels overall fine, denies fatigue, hot flashes and sweating.    10/2/24 /82 then 20 minutes after 156/84, he told that he did not take his BP meds. Denies headache, blurry vision and weakiness.   10/23/24 - BP is 171/88 today, asymptomatic. Feeling "good", no fatigue. Urine flow is "good", nocturia 1-2x/night.  12/23/24 Feels great, no fatigue/hot flash/sweating. Has normal urination. Returned from [place] recently. Denies headache, blurry vision and weakness in arms and legs.   2/24/25 - no fatigue. /117 today, asymptomatic. He attributes this to waiting a long time outside and feeling nervous when coming to the office, reports his BP at home has been 130-140's/70-80's. Urine flow is stable, no urgency, nocturia 1-2x/night. Denies fever, chills, night sweats, hot flashes, headache, dizziness, chest pain, palpitations, SOB, cough, nausea/vomiting, diarrhea/constipation, abdominal pain, dysuria, hematuria, incontinence, LE edema, bleeding, muscle or joint pain/weakness.  5/19/25 feels overall fine, denies fatigue/hot flashes/sweating. Has normal urination. Denies headache, dizziness, weakness and blurry vision.

## 2025-05-19 NOTE — ASSESSMENT
[FreeTextEntry1] : 74 year old male with solitary L1 metastatic hormone sensitive prostate cancer with kaveh 4+4, CHERRY and seminal vesicle involvement, [id number]. He has oligometastatic prostate cancer on conventional images. Rising PSA to 109 in July 2022, biopsy showed multiple cores with kaveh 4+4, MRI showed SV involvement, no adenopathy. Bone scan and CT scans showed only L1 lesion. Started bicalutamide and eligard. Oligo mets for newly diagnosed prostate cancer to add abiraterone/pred on ADT given kaveh 4+4 and T3b. He was evaluated by Dr. Stein. Patient radiated with [facility] protocol. S/p RT to prostate 5500 cGy in 20 fx in Dec 2022, and s/p SBRT L1 2,700 cGy in 3 fx. Started abiraterone/prednisone in October 2022. PSA undetectable as of January 2023  Plan  [facility] with oligo mets s/p prostate radiation and SBRT L1 - iPSA was 109 in July 2022, undetectable as of Jan 2023. Most recent PSA on 12/23/24 was again undetectable.  - Continue abiraterone 1,000mg daily + prednisone 5mg daily, potential side effects reviewed   - Continue Eligard [id number], last given 12/23/24, next due March 2025.  - DEXA ordered today.  Hypertension - BP initially 175/105, he attributes this to waiting long outside and feeling nervous whenever coming to [facility]. Reportedly checks his BP daily at home and has been 130-140's/70-80's. Repeat BP at end of visit was 160/90. - We discussed that abiraterone can cause/worsen HTN. Uncontrolled HTN can lead to heart attack and/or stroke.  - Continue anti-hypertensives as prescribed: amlodipine 5mg daily, losartan-HCTZ 100-12.5mg daily, metoprolol tartrate 25mg daily).  - Monitor BP daily, contact office if SBP >160 and/or DBP >90.  - Continue to follow with Dr. Pace/Cardiology, has f/u with PCP in April.   Hypercalcemia; borderline - 10/2023 PTHrp = normal, not c/w hyperCa of malignancy - 10/2023 PTH = 48, nl -stop calcium supplement   Instructed to contact our office with any new/worsening symptoms. Pt educated regarding plan of care, all questions/concerns addressed to the best of my abilities and his apparent satisfaction. RTC in 6 wks

## 2025-05-19 NOTE — HISTORY OF PRESENT ILLNESS
[Disease: _____________________] : Disease: [unfilled] [T: ___] : T[unfilled] [N: ___] : N[unfilled] [M: ___] : M[unfilled] [AJCC Stage: ____] : AJCC Stage: [unfilled] [de-identified] : Mr. Charlie Tsai is a 72-year-old man who I met in early August for the first time after his PSA had been noted to be markedly elevated. PSA Jan 2021: 18.9 July 2022: 109  MRI of his prostate demonstrated a PI-RADS category 5 lesion noted at the right mid gland, posterior medial peripheral zone. There was evidence of involvement of the bilateral seminal vesicles, right greater than left. No adenopathy was noted.  8/30 underwent prostate biopsy with prostate adenocarcinoma, Sergio 4+4 disease 5 cores among total 12 cores with cancer involvement 5% to 70% on each core.  He received eligard on September 9, 2022 and bicalutamide.  9/27 CT CAP showed Sclerotic lesion in the L1 vertebral body measuring 2.1 cm, concerning for metastatic disease. Additional subcentimeter lesions in the left iliac bone and left femoral head may be bone islands rather than sclerotic metastases. Bilateral nonspecific pulmonary nodules measuring up to 6 mm in the lingula. whole body bone scan showed Focal increased uptake in L1 vertebral body concerning for osseous metastasis.  He is very functional, and totally asymptomatic. Urine flow is normal. He denies bone pain, hesitancy, urgency, dribbling and gross hematuria. Nocturia 1.  10/24/22 MRI spine showed L1 consistent with met, however, small lesion in the L2--may be hemangioma vs met.  11/11/22 feels overall fine, no fatigue/hot flash/sweating/. Denies burning, frequency, gross hematuria and dribbling. Nocturia 1 to 2.  12/14/22: Patient has been feeling okay on abiraterone, appetite and energy are okay. He goes for walks during the day and is able to carry out all of his ADLs and iADLs independently. He reports that he ran out of prednisone about a month ago and has not been taking it, he has been taking abiraterone in the morning on an empty stomach. Patient has regular bowel movements about 2 times a day and has nocturia x 2-3. He denies hot flashes, sweating and LE edema. Patient denies fever, chills, headache, vision changes, cough, shortness of breath, chest pain, palpitations, abdominal pain, nausea/vomiting, diarrhea, constipation, dysuria, hematuria, itching, rashes, joint pain, mucositis, changes in sensation. Patient's BP at this visit is elevated to 161/104, he reports having taken in this morning and ti was 142/79, he does not take blood pressure medication. At present he denies headache, vision changes, numbness and tingling, SOB and chest pain.  1/13/23 his /104. Reports feeling well, denies fatigue, hot flash, sweating, headache, blurry vision and dizziness.  3/3/23 BP ; denied headache, dizziness, blurry vision, n/v/d. BP today 194/81, repeat with manual /90. Pt reports that at home BP around 150s/80s.  5/26/23 today -200/98 to 103, he admits only on intermittent treatment, denies nausea, vomiting, blurry vision and numbness and tingling in fingers and toes.  7/26/23 Today /93, admits taking abiraterone and prednisone all the time. Feels fine, denies fatigue, hot flash and sweating.  9/27/23 his BP med losartan-HCTZ increased from 50-12.5 to 100-12.5mg daily from his cardiologist. Today feels fine, denies headache, dizziness and blurry vision. He eats a lot of salt. He told me most of time his  or below and DBP 80s and 70s at home.  11/13/23 /107, reports feeling great, denies headache, dizziness and blurry vision. At home, his BP was SBP ~140, DBP ~85.  2/26/24: BP today is 190/99, pt denies headache, visual changes, blurry vision, chest pain, palpitations, shortness of breath. He says he checks his BP once a week and it is normally 140s/70s. He takes losartan, however he does not know, nor does he have a list of his medications, due to this med recc could not be reliably completed. He says he feels well, he stays active; walks and runs. Energy is good, appetite is good. He denies pain. Reports nocturia x 1-2. Denies hesitancy, incomplete emptying and leaking.    3/22/24: At last visit on 2/26, BP = 190/99, asked pt to hold abiraterone, he did not do so, he went to see Cardiology on 3/1, his BP at that time was 180/90 --> 164/90, no med changes, seems pt was non compliant with BP meds.  4/5/24 He reports that he started losartan about one month ago in March 2024. He has been complying with BP med.  BP at home around 145-150/80s.  Denies headache, dizziness, blurry vision, N/V, fatigue, night sweats, changes with urination.    [de-identified] : prostate adenocarcinoma [de-identified] : 5/1/24: His blood pressure today was elevated as before. He reports taking his blood pressure medications as prescribed and stated that his blood pressure is in the 140s at home, Otherwise patient is doing well. No complaints.   6/13/24 /88 today, denies headache, blurry vision and weakness. Denies fatigue, hot flashes and sweating. He has normal urination.   8/12/24 reports BP at home 140 to 145/80-85 while on amlodipine, losartan and metoprolol. Today his repeat /94. Denies headache, blurry vision and dizziness.  Feels overall fine, denies fatigue, hot flashes and sweating.    10/2/24 /82 then 20 minutes after 156/84, he told that he did not take his BP meds. Denies headache, blurry vision and weakiness.   10/23/24 - BP is 171/88 today, asymptomatic. Feeling "good", no fatigue. Urine flow is "good", nocturia 1-2x/night.  12/23/24 Feels great, no fatigue/hot flash/sweating. Has normal urination. Returned from [place] recently. Denies headache, blurry vision and weakness in arms and legs.   2/24/25 - no fatigue. /117 today, asymptomatic. He attributes this to waiting a long time outside and feeling nervous when coming to the office, reports his BP at home has been 130-140's/70-80's. Urine flow is stable, no urgency, nocturia 1-2x/night. Denies fever, chills, night sweats, hot flashes, headache, dizziness, chest pain, palpitations, SOB, cough, nausea/vomiting, diarrhea/constipation, abdominal pain, dysuria, hematuria, incontinence, LE edema, bleeding, muscle or joint pain/weakness.  5/19/25 feels overall fine, denies fatigue/hot flashes/sweating. Has normal urination. Denies headache, dizziness, weakness and blurry vision.

## 2025-05-19 NOTE — PHYSICAL EXAM
[Fully active, able to carry on all pre-disease performance without restriction] : Status 0 - Fully active, able to carry on all pre-disease performance without restriction [Normal] : affect appropriate [de-identified] : anicteric  [de-identified] : no LE edema

## 2025-05-19 NOTE — ASSESSMENT
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 He has oligometastatic prostate cancer on conventional images. Rising PSA to 109 in July 2022, biopsy showed multiple cores with kaveh 4+4, MRI showed SV involvement, no adenopathy. Bone scan and CT scans showed only L1 lesion. Started bicalutamide and eligard. Oligo mets for newly diagnosed prostate cancer to add abiraterone/pred on ADT given kaveh 4+4 and T3b. He was evaluated by Dr. Stein. Patient radiated with [facility] protocol. S/p RT to prostate 5500 cGy in 20 fx in Dec 2022, and s/p SBRT L1 2,700 cGy in 3 fx. Started abiraterone/prednisone in October 2022. PSA undetectable as of January 2023  Plan  [facility] with oligo mets s/p prostate radiation and SBRT L1 - iPSA was 109 in July 2022, undetectable as of Jan 2023. Most recent PSA on 12/23/24 was again undetectable.  - Continue abiraterone 1,000mg daily + prednisone 5mg daily, potential side effects reviewed   - Continue Eligard [id number], last given 12/23/24, next due March 2025.  - DEXA ordered today.  Hypertension - BP initially 175/105, he attributes this to waiting long outside and feeling nervous whenever coming to [facility]. Reportedly checks his BP daily at home and has been 130-140's/70-80's. Repeat BP at end of visit was 160/90. - We discussed that abiraterone can cause/worsen HTN. Uncontrolled HTN can lead to heart attack and/or stroke.  - Continue anti-hypertensives as prescribed: amlodipine 5mg daily, losartan-HCTZ 100-12.5mg daily, metoprolol tartrate 25mg daily).  - Monitor BP daily, contact office if SBP >160 and/or DBP >90.  - Continue to follow with Dr. Pace/Cardiology, has f/u with PCP in April.   Hypercalcemia; borderline - 10/2023 PTHrp = normal, not c/w hyperCa of malignancy - 10/2023 PTH = 48, nl -stop calcium supplement   Instructed to contact our office with any new/worsening symptoms. Pt educated regarding plan of care, all questions/concerns addressed to the best of my abilities and his apparent satisfaction. RTC in 6 wks

## 2025-05-19 NOTE — PHYSICAL EXAM
[Fully active, able to carry on all pre-disease performance without restriction] : Status 0 - Fully active, able to carry on all pre-disease performance without restriction [Normal] : affect appropriate [de-identified] : anicteric  [de-identified] : no LE edema

## 2025-07-17 NOTE — ASSESSMENT
[FreeTextEntry1] : 74 year old male with solitary L1 metastatic hormone sensitive prostate cancer with kaveh 4+4, CHERRY and seminal vesicle involvement, [id number]. He has oligometastatic prostate cancer on conventional images. Rising PSA to 109 in July 2022, biopsy showed multiple cores with kaveh 4+4, MRI showed SV involvement, no adenopathy. Bone scan and CT scans showed only L1 lesion. Started bicalutamide and eligard. Oligo mets for newly diagnosed prostate cancer to add abiraterone/pred on ADT given kaveh 4+4 and T3b. He was evaluated by Dr. Stein. Patient radiated with [facility] protocol. S/p RT to prostate 5500 cGy in 20 fx in Dec 2022, and s/p SBRT L1 2,700 cGy in 3 fx. Started abiraterone/prednisone in October 2022. PSA undetectable as of January 2023  Plan  [facility] with oligo mets s/p prostate radiation and SBRT L1 - iPSA was 109 in July 2022, undetectable as of Jan 2023. Most recent PSA on 5/19/25 was again undetectable.  - Continue abiraterone 1,000mg daily + prednisone 5mg daily, potential side effects reviewed   - Continue Eligard [id number], last given 6/9/25, next due Sept 2025 - DEXA ordered in May 2025, not yet done, instructed to schedule.   Hypertension - BP initially 175/105, he attributes this to waiting long outside and feeling nervous whenever coming to [facility]. Reportedly checks his BP daily at home and has been 130-140's/70-80's. Repeat BP at end of visit was 160/90. - We discussed that abiraterone can cause/worsen HTN. Uncontrolled HTN can lead to heart attack and/or stroke.  - Continue anti-hypertensives as prescribed: amlodipine 5mg daily, losartan-HCTZ 100-12.5mg daily, metoprolol tartrate 25mg daily).  - Monitor BP daily, contact office if SBP >160 and/or DBP >90.  - Continue to follow with Dr. Pace/Cardiology, has f/u with PCP in April.   Hypercalcemia; borderline - 10/2023 PTHrp = normal, not c/w hyperCa of malignancy - 10/2023 PTH = 48, nl - stop calcium supplement   Instructed to contact our office with any new/worsening symptoms. Pt educated regarding plan of care, all questions/concerns addressed to the best of my abilities and his apparent satisfaction. RTC in 6wks

## 2025-07-17 NOTE — HISTORY OF PRESENT ILLNESS
[Disease: _____________________] : Disease: [unfilled] [T: ___] : T[unfilled] [N: ___] : N[unfilled] [M: ___] : M[unfilled] [AJCC Stage: ____] : AJCC Stage: [unfilled] [de-identified] : Mr. Charlie Tsai is a 72-year-old man who I met in early August for the first time after his PSA had been noted to be markedly elevated. PSA Jan 2021: 18.9 July 2022: 109  MRI of his prostate demonstrated a PI-RADS category 5 lesion noted at the right mid gland, posterior medial peripheral zone. There was evidence of involvement of the bilateral seminal vesicles, right greater than left. No adenopathy was noted.  8/30 underwent prostate biopsy with prostate adenocarcinoma, Sergio 4+4 disease 5 cores among total 12 cores with cancer involvement 5% to 70% on each core.  He received eligard on September 9, 2022 and bicalutamide.  9/27 CT CAP showed Sclerotic lesion in the L1 vertebral body measuring 2.1 cm, concerning for metastatic disease. Additional subcentimeter lesions in the left iliac bone and left femoral head may be bone islands rather than sclerotic metastases. Bilateral nonspecific pulmonary nodules measuring up to 6 mm in the lingula. whole body bone scan showed Focal increased uptake in L1 vertebral body concerning for osseous metastasis.  He is very functional, and totally asymptomatic. Urine flow is normal. He denies bone pain, hesitancy, urgency, dribbling and gross hematuria. Nocturia 1.  10/24/22 MRI spine showed L1 consistent with met, however, small lesion in the L2--may be hemangioma vs met.  11/11/22 feels overall fine, no fatigue/hot flash/sweating/. Denies burning, frequency, gross hematuria and dribbling. Nocturia 1 to 2.  12/14/22: Patient has been feeling okay on abiraterone, appetite and energy are okay. He goes for walks during the day and is able to carry out all of his ADLs and iADLs independently. He reports that he ran out of prednisone about a month ago and has not been taking it, he has been taking abiraterone in the morning on an empty stomach. Patient has regular bowel movements about 2 times a day and has nocturia x 2-3. He denies hot flashes, sweating and LE edema. Patient denies fever, chills, headache, vision changes, cough, shortness of breath, chest pain, palpitations, abdominal pain, nausea/vomiting, diarrhea, constipation, dysuria, hematuria, itching, rashes, joint pain, mucositis, changes in sensation. Patient's BP at this visit is elevated to 161/104, he reports having taken in this morning and ti was 142/79, he does not take blood pressure medication. At present he denies headache, vision changes, numbness and tingling, SOB and chest pain.  1/13/23 his /104. Reports feeling well, denies fatigue, hot flash, sweating, headache, blurry vision and dizziness.  3/3/23 BP ; denied headache, dizziness, blurry vision, n/v/d. BP today 194/81, repeat with manual /90. Pt reports that at home BP around 150s/80s.  5/26/23 today -200/98 to 103, he admits only on intermittent treatment, denies nausea, vomiting, blurry vision and numbness and tingling in fingers and toes.  7/26/23 Today /93, admits taking abiraterone and prednisone all the time. Feels fine, denies fatigue, hot flash and sweating.  9/27/23 his BP med losartan-HCTZ increased from 50-12.5 to 100-12.5mg daily from his cardiologist. Today feels fine, denies headache, dizziness and blurry vision. He eats a lot of salt. He told me most of time his  or below and DBP 80s and 70s at home.  11/13/23 /107, reports feeling great, denies headache, dizziness and blurry vision. At home, his BP was SBP ~140, DBP ~85.  2/26/24: BP today is 190/99, pt denies headache, visual changes, blurry vision, chest pain, palpitations, shortness of breath. He says he checks his BP once a week and it is normally 140s/70s. He takes losartan, however he does not know, nor does he have a list of his medications, due to this med recc could not be reliably completed. He says he feels well, he stays active; walks and runs. Energy is good, appetite is good. He denies pain. Reports nocturia x 1-2. Denies hesitancy, incomplete emptying and leaking.    3/22/24: At last visit on 2/26, BP = 190/99, asked pt to hold abiraterone, he did not do so, he went to see Cardiology on 3/1, his BP at that time was 180/90 --> 164/90, no med changes, seems pt was non compliant with BP meds.  4/5/24 He reports that he started losartan about one month ago in March 2024. He has been complying with BP med.  BP at home around 145-150/80s.  Denies headache, dizziness, blurry vision, N/V, fatigue, night sweats, changes with urination.    [de-identified] : prostate adenocarcinoma [de-identified] : 5/1/24: His blood pressure today was elevated as before. He reports taking his blood pressure medications as prescribed and stated that his blood pressure is in the 140s at home, Otherwise patient is doing well. No complaints.   6/13/24 /88 today, denies headache, blurry vision and weakness. Denies fatigue, hot flashes and sweating. He has normal urination.   8/12/24 reports BP at home 140 to 145/80-85 while on amlodipine, losartan and metoprolol. Today his repeat /94. Denies headache, blurry vision and dizziness.  Feels overall fine, denies fatigue, hot flashes and sweating.    10/2/24 /82 then 20 minutes after 156/84, he told that he did not take his BP meds. Denies headache, blurry vision and weakiness.   10/23/24 - BP is 171/88 today, asymptomatic. Feeling "good", no fatigue. Urine flow is "good", nocturia 1-2x/night.  12/23/24 Feels great, no fatigue/hot flash/sweating. Has normal urination. Returned from [place] recently. Denies headache, blurry vision and weakness in arms and legs.   2/24/25 - no fatigue. /117 today, asymptomatic. He attributes this to waiting a long time outside and feeling nervous when coming to the office, reports his BP at home has been 130-140's/70-80's. Urine flow is stable, no urgency, nocturia 1-2x/night. Denies fever, chills, night sweats, hot flashes, headache, dizziness, chest pain, palpitations, SOB, cough, nausea/vomiting, diarrhea/constipation, abdominal pain, dysuria, hematuria, incontinence, LE edema, bleeding, muscle or joint pain/weakness.  5/19/25 feels overall fine, denies fatigue/hot flashes/sweating. Has normal urination. Denies headache, dizziness, weakness and blurry vision.   7/28/25 -

## 2025-07-28 NOTE — PHYSICAL EXAM
[Fully active, able to carry on all pre-disease performance without restriction] : Status 0 - Fully active, able to carry on all pre-disease performance without restriction [Normal] : affect appropriate [de-identified] : anicteric  [de-identified] : no LE edema

## 2025-07-28 NOTE — PHYSICAL EXAM
[Fully active, able to carry on all pre-disease performance without restriction] : Status 0 - Fully active, able to carry on all pre-disease performance without restriction [Normal] : affect appropriate [de-identified] : anicteric  [de-identified] : no LE edema

## 2025-07-28 NOTE — ASSESSMENT
[FreeTextEntry1] : 74 year old male with solitary L1 metastatic hormone sensitive prostate cancer with kaveh 4+4, CHERRY and seminal vesicle involvement, [id number]. He has oligometastatic prostate cancer on conventional images. Rising PSA to 109 in July 2022, biopsy showed multiple cores with kaveh 4+4, MRI showed SV involvement, no adenopathy. Bone scan and CT scans showed only L1 lesion. Started bicalutamide and eligard. Oligo mets for newly diagnosed prostate cancer to add abiraterone/pred on ADT given kaveh 4+4 and T3b. He was evaluated by Dr. Stein. Patient radiated with [facility] protocol. S/p RT to prostate 5500 cGy in 20 fx in Dec 2022, and s/p SBRT L1 2,700 cGy in 3 fx. Started abiraterone/prednisone in October 2022. PSA undetectable as of January 2023  Plan  [facility] with oligo mets s/p prostate radiation and SBRT L1 - iPSA was 109 in July 2022, undetectable as of Jan 2023. Most recent PSA on 5/19/25 was again undetectable.  - Continue abiraterone 1,000mg daily + prednisone 5mg daily, potential side effects reviewed   - Continue Eligard [id number], last given 6/9/25, will schedule for 8/29/25 per pt request.  - DEXA ordered in May 2025, not yet done, instructed to schedule.   Hypertension - BP initially 193/100, asymptomatic. Pt checks BP daily at home and has been consistently 130-140's/80's. He feels nervous when coming to [facility] and there was a long wait.  - We discussed that abiraterone can cause/worsen HTN. Uncontrolled HTN can lead to heart attack and/or stroke.  - Continue anti-hypertensives as prescribed: amlodipine 5mg daily, losartan-HCTZ 100-12.5mg daily, metoprolol tartrate 25mg daily).  - Monitor BP daily, contact office if SBP >160 and/or DBP >90.  - Continue to follow with Dr. Pace/Cardiology.   Hypercalcemia; borderline - 10/2023 PTHrp = normal, not c/w hyperCa of malignancy - 10/2023 PTH = 48, nl - stop calcium supplement  - Ca stable, repeat today is pending.   Instructed to contact our office with any new/worsening symptoms. Pt educated regarding plan of care, all questions/concerns addressed to the best of my abilities and his apparent satisfaction. RTC in 8wks

## 2025-07-28 NOTE — HISTORY OF PRESENT ILLNESS
[de-identified] : Mr. Charlie Tsai is a 72-year-old man who I met in early August for the first time after his PSA had been noted to be markedly elevated. PSA Jan 2021: 18.9 July 2022: 109  MRI of his prostate demonstrated a PI-RADS category 5 lesion noted at the right mid gland, posterior medial peripheral zone. There was evidence of involvement of the bilateral seminal vesicles, right greater than left. No adenopathy was noted.  8/30 underwent prostate biopsy with prostate adenocarcinoma, Sergio 4+4 disease 5 cores among total 12 cores with cancer involvement 5% to 70% on each core.  He received eligard on September 9, 2022 and bicalutamide.  9/27 CT CAP showed Sclerotic lesion in the L1 vertebral body measuring 2.1 cm, concerning for metastatic disease. Additional subcentimeter lesions in the left iliac bone and left femoral head may be bone islands rather than sclerotic metastases. Bilateral nonspecific pulmonary nodules measuring up to 6 mm in the lingula. whole body bone scan showed Focal increased uptake in L1 vertebral body concerning for osseous metastasis.  He is very functional, and totally asymptomatic. Urine flow is normal. He denies bone pain, hesitancy, urgency, dribbling and gross hematuria. Nocturia 1.  10/24/22 MRI spine showed L1 consistent with met, however, small lesion in the L2--may be hemangioma vs met.  11/11/22 feels overall fine, no fatigue/hot flash/sweating/. Denies burning, frequency, gross hematuria and dribbling. Nocturia 1 to 2.  12/14/22: Patient has been feeling okay on abiraterone, appetite and energy are okay. He goes for walks during the day and is able to carry out all of his ADLs and iADLs independently. He reports that he ran out of prednisone about a month ago and has not been taking it, he has been taking abiraterone in the morning on an empty stomach. Patient has regular bowel movements about 2 times a day and has nocturia x 2-3. He denies hot flashes, sweating and LE edema. Patient denies fever, chills, headache, vision changes, cough, shortness of breath, chest pain, palpitations, abdominal pain, nausea/vomiting, diarrhea, constipation, dysuria, hematuria, itching, rashes, joint pain, mucositis, changes in sensation. Patient's BP at this visit is elevated to 161/104, he reports having taken in this morning and ti was 142/79, he does not take blood pressure medication. At present he denies headache, vision changes, numbness and tingling, SOB and chest pain.  1/13/23 his /104. Reports feeling well, denies fatigue, hot flash, sweating, headache, blurry vision and dizziness.  3/3/23 BP ; denied headache, dizziness, blurry vision, n/v/d. BP today 194/81, repeat with manual /90. Pt reports that at home BP around 150s/80s.  5/26/23 today -200/98 to 103, he admits only on intermittent treatment, denies nausea, vomiting, blurry vision and numbness and tingling in fingers and toes.  7/26/23 Today /93, admits taking abiraterone and prednisone all the time. Feels fine, denies fatigue, hot flash and sweating.  9/27/23 his BP med losartan-HCTZ increased from 50-12.5 to 100-12.5mg daily from his cardiologist. Today feels fine, denies headache, dizziness and blurry vision. He eats a lot of salt. He told me most of time his  or below and DBP 80s and 70s at home.  11/13/23 /107, reports feeling great, denies headache, dizziness and blurry vision. At home, his BP was SBP ~140, DBP ~85.  2/26/24: BP today is 190/99, pt denies headache, visual changes, blurry vision, chest pain, palpitations, shortness of breath. He says he checks his BP once a week and it is normally 140s/70s. He takes losartan, however he does not know, nor does he have a list of his medications, due to this med recc could not be reliably completed. He says he feels well, he stays active; walks and runs. Energy is good, appetite is good. He denies pain. Reports nocturia x 1-2. Denies hesitancy, incomplete emptying and leaking.    3/22/24: At last visit on 2/26, BP = 190/99, asked pt to hold abiraterone, he did not do so, he went to see Cardiology on 3/1, his BP at that time was 180/90 --> 164/90, no med changes, seems pt was non compliant with BP meds.  4/5/24 He reports that he started losartan about one month ago in March 2024. He has been complying with BP med.  BP at home around 145-150/80s.  Denies headache, dizziness, blurry vision, N/V, fatigue, night sweats, changes with urination.    [de-identified] : prostate adenocarcinoma [de-identified] : 5/1/24: His blood pressure today was elevated as before. He reports taking his blood pressure medications as prescribed and stated that his blood pressure is in the 140s at home, Otherwise patient is doing well. No complaints.   6/13/24 /88 today, denies headache, blurry vision and weakness. Denies fatigue, hot flashes and sweating. He has normal urination.   8/12/24 reports BP at home 140 to 145/80-85 while on amlodipine, losartan and metoprolol. Today his repeat /94. Denies headache, blurry vision and dizziness.  Feels overall fine, denies fatigue, hot flashes and sweating.    10/2/24 /82 then 20 minutes after 156/84, he told that he did not take his BP meds. Denies headache, blurry vision and weakiness.   10/23/24 - BP is 171/88 today, asymptomatic. Feeling "good", no fatigue. Urine flow is "good", nocturia 1-2x/night.  12/23/24 Feels great, no fatigue/hot flash/sweating. Has normal urination. Returned from [place] recently. Denies headache, blurry vision and weakness in arms and legs.   2/24/25 - no fatigue. /117 today, asymptomatic. He attributes this to waiting a long time outside and feeling nervous when coming to the office, reports his BP at home has been 130-140's/70-80's. Urine flow is stable, no urgency, nocturia 1-2x/night. Denies fever, chills, night sweats, hot flashes, headache, dizziness, chest pain, palpitations, SOB, cough, nausea/vomiting, diarrhea/constipation, abdominal pain, dysuria, hematuria, incontinence, LE edema, bleeding, muscle or joint pain/weakness.  5/19/25 feels overall fine, denies fatigue/hot flashes/sweating. Has normal urination. Denies headache, dizziness, weakness and blurry vision.   7/28/25 - no fatigue. Urine flow is stable, no dysuria/hematuria/incontinence, nocturia 1-2x/night.

## 2025-07-28 NOTE — ASSESSMENT
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 He has oligometastatic prostate cancer on conventional images. Rising PSA to 109 in July 2022, biopsy showed multiple cores with kaveh 4+4, MRI showed SV involvement, no adenopathy. Bone scan and CT scans showed only L1 lesion. Started bicalutamide and eligard. Oligo mets for newly diagnosed prostate cancer to add abiraterone/pred on ADT given kaveh 4+4 and T3b. He was evaluated by Dr. Stein. Patient radiated with [facility] protocol. S/p RT to prostate 5500 cGy in 20 fx in Dec 2022, and s/p SBRT L1 2,700 cGy in 3 fx. Started abiraterone/prednisone in October 2022. PSA undetectable as of January 2023  Plan  [facility] with oligo mets s/p prostate radiation and SBRT L1 - iPSA was 109 in July 2022, undetectable as of Jan 2023. Most recent PSA on 5/19/25 was again undetectable.  - Continue abiraterone 1,000mg daily + prednisone 5mg daily, potential side effects reviewed   - Continue Eligard [id number], last given 6/9/25, will schedule for 8/29/25 per pt request.  - DEXA ordered in May 2025, not yet done, instructed to schedule.   Hypertension - BP initially 193/100, asymptomatic. Pt checks BP daily at home and has been consistently 130-140's/80's. He feels nervous when coming to [facility] and there was a long wait.  - We discussed that abiraterone can cause/worsen HTN. Uncontrolled HTN can lead to heart attack and/or stroke.  - Continue anti-hypertensives as prescribed: amlodipine 5mg daily, losartan-HCTZ 100-12.5mg daily, metoprolol tartrate 25mg daily).  - Monitor BP daily, contact office if SBP >160 and/or DBP >90.  - Continue to follow with Dr. Pace/Cardiology.   Hypercalcemia; borderline - 10/2023 PTHrp = normal, not c/w hyperCa of malignancy - 10/2023 PTH = 48, nl - stop calcium supplement  - Ca stable, repeat today is pending.   Instructed to contact our office with any new/worsening symptoms. Pt educated regarding plan of care, all questions/concerns addressed to the best of my abilities and his apparent satisfaction. RTC in 8wks

## 2025-07-28 NOTE — REVIEW OF SYSTEMS
[Fever] : no fever [Chills] : no chills [Night Sweats] : no night sweats [Fatigue] : no fatigue [Chest Pain] : no chest pain [Palpitations] : no palpitations [Lower Ext Edema] : no lower extremity edema [Shortness Of Breath] : no shortness of breath [Cough] : no cough [Abdominal Pain] : no abdominal pain [Vomiting] : no vomiting [Constipation] : no constipation [Diarrhea: Grade 0] : Diarrhea: Grade 0 [Dysuria] : no dysuria [Incontinence] : no incontinence [Joint Pain] : no joint pain [Joint Stiffness] : no joint stiffness [Muscle Pain] : no muscle pain [Muscle Weakness] : no muscle weakness [Dizziness] : no dizziness [Hot Flashes] : no hot flashes [Easy Bleeding] : no tendency for easy bleeding [Easy Bruising] : no tendency for easy bruising